# Patient Record
Sex: MALE | Race: WHITE | Employment: OTHER | URBAN - METROPOLITAN AREA
[De-identification: names, ages, dates, MRNs, and addresses within clinical notes are randomized per-mention and may not be internally consistent; named-entity substitution may affect disease eponyms.]

---

## 2017-11-01 ENCOUNTER — HOSPITAL ENCOUNTER (OUTPATIENT)
Dept: OTHER | Age: 62
Discharge: HOME | End: 2017-11-30

## 2017-12-01 ENCOUNTER — HOSPITAL ENCOUNTER (OUTPATIENT)
Dept: OTHER | Age: 62
Discharge: HOME | End: 2017-12-31

## 2018-01-01 ENCOUNTER — HOSPITAL ENCOUNTER (OUTPATIENT)
Dept: OTHER | Age: 63
Discharge: HOME | End: 2018-01-31

## 2018-02-01 ENCOUNTER — HOSPITAL ENCOUNTER (OUTPATIENT)
Dept: OTHER | Age: 63
Discharge: HOME | End: 2018-02-23

## 2021-07-16 ENCOUNTER — APPOINTMENT (OUTPATIENT)
Dept: GENERAL RADIOLOGY | Age: 66
End: 2021-07-16
Payer: MEDICARE

## 2021-07-16 ENCOUNTER — HOSPITAL ENCOUNTER (EMERGENCY)
Age: 66
Discharge: HOME | End: 2021-07-16
Attending: STUDENT IN AN ORGANIZED HEALTH CARE EDUCATION/TRAINING PROGRAM
Payer: MEDICARE

## 2021-07-16 VITALS
WEIGHT: 213 LBS | HEIGHT: 71 IN | DIASTOLIC BLOOD PRESSURE: 62 MMHG | HEART RATE: 90 BPM | TEMPERATURE: 99.4 F | OXYGEN SATURATION: 95 % | BODY MASS INDEX: 29.82 KG/M2 | RESPIRATION RATE: 20 BRPM | SYSTOLIC BLOOD PRESSURE: 113 MMHG

## 2021-07-16 DIAGNOSIS — R79.89 ELEVATED D-DIMER: ICD-10-CM

## 2021-07-16 DIAGNOSIS — A41.9 SEPTICEMIA (HCC): ICD-10-CM

## 2021-07-16 DIAGNOSIS — N17.9 AKI (ACUTE KIDNEY INJURY) (HCC): ICD-10-CM

## 2021-07-16 DIAGNOSIS — J18.9 PNEUMONIA DUE TO INFECTIOUS ORGANISM, UNSPECIFIED LATERALITY, UNSPECIFIED PART OF LUNG: Primary | ICD-10-CM

## 2021-07-16 DIAGNOSIS — R09.02 HYPOXEMIA: ICD-10-CM

## 2021-07-16 LAB
A/G RATIO: 1.6 (ref 1.1–2.2)
ALBUMIN SERPL-MCNC: 4.3 G/DL (ref 3.4–5)
ALP BLD-CCNC: 95 U/L (ref 40–129)
ALT SERPL-CCNC: 15 U/L (ref 10–40)
ANION GAP SERPL CALCULATED.3IONS-SCNC: 11 MMOL/L (ref 3–16)
APTT: 35.9 SEC (ref 26.2–38.6)
AST SERPL-CCNC: 17 U/L (ref 15–37)
BASE EXCESS VENOUS: -4.5 MMOL/L (ref -3–3)
BASOPHILS ABSOLUTE: 0 K/UL (ref 0–0.2)
BASOPHILS RELATIVE PERCENT: 0 %
BILIRUB SERPL-MCNC: 0.5 MG/DL (ref 0–1)
BUN BLDV-MCNC: 22 MG/DL (ref 7–20)
CALCIUM SERPL-MCNC: 9.2 MG/DL (ref 8.3–10.6)
CARBOXYHEMOGLOBIN: 1.3 % (ref 0–1.5)
CHLORIDE BLD-SCNC: 109 MMOL/L (ref 99–110)
CO2: 21 MMOL/L (ref 21–32)
CREAT SERPL-MCNC: 1.8 MG/DL (ref 0.8–1.3)
D DIMER: 448 NG/ML DDU (ref 0–229)
EKG ATRIAL RATE: 106 BPM
EKG DIAGNOSIS: NORMAL
EKG P AXIS: 40 DEGREES
EKG P-R INTERVAL: 150 MS
EKG Q-T INTERVAL: 326 MS
EKG QRS DURATION: 76 MS
EKG QTC CALCULATION (BAZETT): 433 MS
EKG R AXIS: 13 DEGREES
EKG T AXIS: 86 DEGREES
EKG VENTRICULAR RATE: 106 BPM
EOSINOPHILS ABSOLUTE: 0.8 K/UL (ref 0–0.6)
EOSINOPHILS RELATIVE PERCENT: 4 %
GFR AFRICAN AMERICAN: 46
GFR NON-AFRICAN AMERICAN: 38
GLOBULIN: 2.7 G/DL
GLUCOSE BLD-MCNC: 149 MG/DL (ref 70–99)
HCO3 VENOUS: 19.6 MMOL/L (ref 23–29)
HCT VFR BLD CALC: 37.3 % (ref 40.5–52.5)
HEMATOLOGY PATH CONSULT: YES
HEMOGLOBIN: 12.3 G/DL (ref 13.5–17.5)
INR BLD: 1.11 (ref 0.88–1.12)
LACTIC ACID, SEPSIS: 1.9 MMOL/L (ref 0.4–1.9)
LYMPHOCYTES ABSOLUTE: 5 K/UL (ref 1–5.1)
LYMPHOCYTES RELATIVE PERCENT: 24 %
MCH RBC QN AUTO: 31.9 PG (ref 26–34)
MCHC RBC AUTO-ENTMCNC: 33 G/DL (ref 31–36)
MCV RBC AUTO: 96.7 FL (ref 80–100)
METHEMOGLOBIN VENOUS: 0.1 %
MONOCYTES ABSOLUTE: 1.5 K/UL (ref 0–1.3)
MONOCYTES RELATIVE PERCENT: 7 %
NEUTROPHILS ABSOLUTE: 13.7 K/UL (ref 1.7–7.7)
NEUTROPHILS RELATIVE PERCENT: 65 %
O2 CONTENT, VEN: 13 VOL %
O2 SAT, VEN: 71 %
O2 THERAPY: ABNORMAL
PCO2, VEN: 33.2 MMHG (ref 40–50)
PDW BLD-RTO: 13.5 % (ref 12.4–15.4)
PH VENOUS: 7.39 (ref 7.35–7.45)
PLATELET # BLD: 432 K/UL (ref 135–450)
PMV BLD AUTO: 8 FL (ref 5–10.5)
PO2, VEN: 37.1 MMHG (ref 25–40)
POTASSIUM REFLEX MAGNESIUM: 4.7 MMOL/L (ref 3.5–5.1)
PRO-BNP: 363 PG/ML (ref 0–124)
PROCALCITONIN: 0.15 NG/ML (ref 0–0.15)
PROTHROMBIN TIME: 12.7 SEC (ref 9.9–12.7)
RBC # BLD: 3.86 M/UL (ref 4.2–5.9)
SODIUM BLD-SCNC: 141 MMOL/L (ref 136–145)
TCO2 CALC VENOUS: 21 MMOL/L
TOTAL PROTEIN: 7 G/DL (ref 6.4–8.2)
TROPONIN: <0.01 NG/ML
WBC # BLD: 21 K/UL (ref 4–11)

## 2021-07-16 PROCEDURE — 84145 PROCALCITONIN (PCT): CPT

## 2021-07-16 PROCEDURE — 83605 ASSAY OF LACTIC ACID: CPT

## 2021-07-16 PROCEDURE — 99291 CRITICAL CARE FIRST HOUR: CPT

## 2021-07-16 PROCEDURE — 96365 THER/PROPH/DIAG IV INF INIT: CPT

## 2021-07-16 PROCEDURE — 96372 THER/PROPH/DIAG INJ SC/IM: CPT

## 2021-07-16 PROCEDURE — 83880 ASSAY OF NATRIURETIC PEPTIDE: CPT

## 2021-07-16 PROCEDURE — 2580000003 HC RX 258: Performed by: STUDENT IN AN ORGANIZED HEALTH CARE EDUCATION/TRAINING PROGRAM

## 2021-07-16 PROCEDURE — 6370000000 HC RX 637 (ALT 250 FOR IP): Performed by: STUDENT IN AN ORGANIZED HEALTH CARE EDUCATION/TRAINING PROGRAM

## 2021-07-16 PROCEDURE — 82803 BLOOD GASES ANY COMBINATION: CPT

## 2021-07-16 PROCEDURE — 96366 THER/PROPH/DIAG IV INF ADDON: CPT

## 2021-07-16 PROCEDURE — 87040 BLOOD CULTURE FOR BACTERIA: CPT

## 2021-07-16 PROCEDURE — 6360000002 HC RX W HCPCS: Performed by: STUDENT IN AN ORGANIZED HEALTH CARE EDUCATION/TRAINING PROGRAM

## 2021-07-16 PROCEDURE — 85379 FIBRIN DEGRADATION QUANT: CPT

## 2021-07-16 PROCEDURE — 99285 EMERGENCY DEPT VISIT HI MDM: CPT

## 2021-07-16 PROCEDURE — 93005 ELECTROCARDIOGRAM TRACING: CPT | Performed by: STUDENT IN AN ORGANIZED HEALTH CARE EDUCATION/TRAINING PROGRAM

## 2021-07-16 PROCEDURE — 36415 COLL VENOUS BLD VENIPUNCTURE: CPT

## 2021-07-16 PROCEDURE — 85610 PROTHROMBIN TIME: CPT

## 2021-07-16 PROCEDURE — 96375 TX/PRO/DX INJ NEW DRUG ADDON: CPT

## 2021-07-16 PROCEDURE — 71045 X-RAY EXAM CHEST 1 VIEW: CPT

## 2021-07-16 PROCEDURE — 80053 COMPREHEN METABOLIC PANEL: CPT

## 2021-07-16 PROCEDURE — 85025 COMPLETE CBC W/AUTO DIFF WBC: CPT

## 2021-07-16 PROCEDURE — 85730 THROMBOPLASTIN TIME PARTIAL: CPT

## 2021-07-16 PROCEDURE — 84484 ASSAY OF TROPONIN QUANT: CPT

## 2021-07-16 PROCEDURE — 93010 ELECTROCARDIOGRAM REPORT: CPT | Performed by: INTERNAL MEDICINE

## 2021-07-16 RX ORDER — 0.9 % SODIUM CHLORIDE 0.9 %
1000 INTRAVENOUS SOLUTION INTRAVENOUS ONCE
Status: COMPLETED | OUTPATIENT
Start: 2021-07-16 | End: 2021-07-16

## 2021-07-16 RX ORDER — 0.9 % SODIUM CHLORIDE 0.9 %
1000 INTRAVENOUS SOLUTION INTRAVENOUS ONCE
Status: DISCONTINUED | OUTPATIENT
Start: 2021-07-16 | End: 2021-07-16 | Stop reason: HOSPADM

## 2021-07-16 RX ORDER — ACETAMINOPHEN 325 MG/1
650 TABLET ORAL ONCE
Status: COMPLETED | OUTPATIENT
Start: 2021-07-16 | End: 2021-07-16

## 2021-07-16 RX ADMIN — DEXTROSE MONOHYDRATE 500 MG: 50 INJECTION, SOLUTION INTRAVENOUS at 09:43

## 2021-07-16 RX ADMIN — ACETAMINOPHEN 650 MG: 325 TABLET ORAL at 09:07

## 2021-07-16 RX ADMIN — ENOXAPARIN SODIUM 100 MG: 100 INJECTION SUBCUTANEOUS at 09:41

## 2021-07-16 RX ADMIN — CEFTRIAXONE SODIUM 1000 MG: 1 INJECTION, POWDER, FOR SOLUTION INTRAMUSCULAR; INTRAVENOUS at 09:40

## 2021-07-16 RX ADMIN — SODIUM CHLORIDE 1000 ML: 9 INJECTION, SOLUTION INTRAVENOUS at 09:08

## 2021-07-16 ASSESSMENT — ENCOUNTER SYMPTOMS
PHOTOPHOBIA: 0
DIARRHEA: 0
RHINORRHEA: 0
NAUSEA: 0
ABDOMINAL PAIN: 0
COUGH: 1
BACK PAIN: 0
CONSTIPATION: 0
VOMITING: 0
SHORTNESS OF BREATH: 1
SORE THROAT: 0

## 2021-07-16 ASSESSMENT — PAIN DESCRIPTION - LOCATION: LOCATION: HIP

## 2021-07-16 ASSESSMENT — PAIN DESCRIPTION - ORIENTATION: ORIENTATION: RIGHT

## 2021-07-16 ASSESSMENT — PAIN SCALES - GENERAL: PAINLEVEL_OUTOF10: 0

## 2021-07-16 NOTE — ED NOTES
Rossi Chua at the transfer center is paging the hospitalist at [place].      Asaf Ron  07/16/21 9246

## 2021-07-16 NOTE — ED NOTES
Report to [facility]. Pt alert and oriented and VSS upon departure.       Racheal Abdi RN  07/16/21 8046

## 2021-07-16 NOTE — ED PROVIDER NOTES
[facility]      Pt Name: Wilber Sanchez  MRN: [id number]  [place] 1955  Date of evaluation: 7/16/2021  Provider: Radha Thompson, [address] [place] [address]       Chief Complaint   Patient presents with    Fever     pt states that he woke up this AM weak, shaky, and running a fever. 86% on room air upon arrival.          HISTORY OF PRESENT ILLNESS   (Location/Symptom, Timing/Onset, Context/Setting, Quality, Duration, Modifying Factors, Severity)  Note limiting factors. Wilber Sanchez is a 76 y.o. male who presents to the emergency department complaining of fevers chills feeling shaky. States that he saw his P[facility] yesterday, [facility], states that yesterday began to feel chills however significant symptoms developed overnight. Patient arrives febrile at 100.4 orally, was noted to be hypoxic to 87% on room air. Is a smoker. Is not on oxygen at baseline. Sats improved on couple liters nasal cannula with improvement of shortness of breath complaint. Initially patient had denied coughing however significant other later stated that he had been coughing beginning yesterday evening. States that he is been like this previously with episodes of pneumonia. He is tachycardic on arrival, clinically appears dehydrated. Denies nausea vomiting diarrhea. Denies decreased p.o. intake. Denies history of DVT/PE. Denies lower extremity symmetry posterior calf pain, recent travel immobilization. Patient has received both coronavirus vaccines earlier this year. Patient denies headache new neck or back pain, chest pain, abdominal pain. His biggest complaint at presentation with generalized weakness and fatigue. Nursing Notes were reviewed.     PAST MEDICAL HISTORY     Past Medical History:   Diagnosis Date    Large cell lymphoma ([facility] [address]     Staphylococcus infection in conditions classified elsewhere and of unspecified site     spinal         SURGICAL HISTORY Past Surgical History:   Procedure Laterality Date    BACK SURGERY      KIDNEY REMOVAL      left    TONSILLECTOMY           CURRENT MEDICATIONS       Previous Medications    OXYCODONE-ACETAMINOPHEN (PERCOCET) 5-325 MG PER TABLET    Take 1 tablet by mouth every 4 hours as needed for Pain. ALLERGIES     Ibuprofen and Ketorolac tromethamine    FAMILY HISTORY     History reviewed. No pertinent family history. SOCIAL HISTORY       Social History     Socioeconomic History    Marital status:      Spouse name: None    Number of children: None    Years of education: None    Highest education level: None   Occupational History    None   Tobacco Use    Smoking status: Current Every Day Smoker     Packs/day: 0.50     Types: E-Cigarettes    Smokeless tobacco: Never Used   Vaping Use    Vaping Use: Never assessed   Substance and Sexual Activity    Alcohol use: Never    Drug use: Never    Sexual activity: None   Other Topics Concern    None   Social History Narrative    None     Social Determinants of Health     Financial Resource Strain:     Difficulty of Paying Living Expenses:    Food Insecurity:     Worried About Running Out of Food in the Last Year:     Ran Out of Food in the Last Year:    Transportation Needs:     Lack of Transportation (Medical):      Lack of Transportation (Non-Medical):    Physical Activity:     Days of Exercise per Week:     Minutes of Exercise per Session:    Stress:     Feeling of Stress :    Social Connections:     Frequency of Communication with Friends and Family:     Frequency of Social Gatherings with Friends and Family:     Attends [employer] Services:     Active Member of Clubs or Organizations:     Attends Club or Organization Meetings:     Marital Status:    Intimate Partner Violence:     Fear of Current or Ex-Partner:     Emotionally Abused:     Physically Abused:     Sexually Abused:        SCREENINGS                            REVIEW OF SYSTEMS    (2-9 systems for level 4, 10 or more for level 5)   Review of Systems   Constitutional: Positive for chills, fatigue and fever. HENT: Negative for congestion, rhinorrhea and sore throat. Eyes: Negative for photophobia and visual disturbance. Respiratory: Positive for cough and shortness of breath. Cardiovascular: Negative for chest pain and palpitations. Gastrointestinal: Negative for abdominal pain, constipation, diarrhea, nausea and vomiting. Genitourinary: Negative for decreased urine volume. Musculoskeletal: Positive for myalgias. Negative for back pain and neck pain. Skin: Negative for rash. Neurological: Negative for weakness, numbness and headaches. Psychiatric/Behavioral: Negative for confusion. PHYSICAL EXAM    (up to 7 for level 4, 8 or more for level 5)     ED Triage Vitals   BP Temp Temp src Pulse Resp SpO2 Height Weight   -- -- -- -- -- -- -- --       Physical Exam  Constitutional:       General: He is not in acute distress. Appearance: He is ill-appearing. He is not diaphoretic. HENT:      Head: Normocephalic and atraumatic. Mouth/Throat:      Mouth: Mucous membranes are dry. Eyes:      Pupils: Pupils are equal, round, and reactive to light. Neck:      Trachea: No tracheal deviation. Cardiovascular:      Rate and Rhythm: Regular rhythm. Tachycardia present. Pulmonary:      Effort: Pulmonary effort is normal. No respiratory distress. Breath sounds: No stridor. No wheezing. Abdominal:      General: There is no distension. Palpations: Abdomen is soft. Tenderness: There is no abdominal tenderness. Musculoskeletal:         General: No deformity. Normal range of motion. Cervical back: Normal range of motion and neck supple. Skin:     General: Skin is warm and dry. Neurological:      Mental Status: He is alert and oriented to person, place, and time.          DIAGNOSTIC RESULTS     EKG: All EKG's are interpreted by the Emergency Department Physician who either signs or Co-signs this chart in the absence of a cardiologist.      The Ekg interpreted by me shows  sinus tachycardia, [id number]=[id number] with a rate of   Axis is   Normal  QTc is  normal  Intervals and Durations are unremarkable. ST Segments: nonspecific changes        RADIOLOGY:   Non-plain film images such as CT, Ultrasound and MRI are read by the radiologist. Plain radiographic images are visualized and preliminarily interpreted by the emergency physician. Interpretation per the Radiologist below, if available at the time of this note:    XR CHEST PORTABLE   Final Result   Moderate right basilar airspace disease, lower lobe and possibly right middle   lobe as well. This likely represents pneumonia. LABS:  Labs Reviewed   CBC WITH AUTO DIFFERENTIAL - Abnormal; Notable for the following components:       Result Value    WBC 21.0 (*)     RBC 3.86 (*)     Hemoglobin 12.3 (*)     Hematocrit 37.3 (*)     All other components within normal limits    Narrative:     Performed at:  [facility]  [address] [place] [address]. [place] [facility] [address]    Phone [phone]   COMPREHENSIVE METABOLIC PANEL W/ REFLEX TO MG FOR LOW K - Abnormal; Notable for the following components:    Glucose 149 (*)     BUN 22 (*)     CREATININE 1.8 (*)     GFR Non- 38 (*)     GFR  46 (*)     All other components within normal limits    Narrative:     Performed at:  [address] [facility]  [address] [place] [address]. [place], [id number]   Phone [phone] - Abnormal; Notable for the following components:    Pro- (*)     All other components within normal limits    Narrative:     Performed at:  [address] [facility]  [address] [place] [address].  [place] [facility] [address] [employer]   Phone [phone]   BLOOD GAS, VENOUS - Abnormal; Notable for the following components:    pCO2, [place] 33.2 (*)     HCO3, Venous 19.6 (*)     Base Excess, [place] -4.5 (*)     All other components within normal limits    Narrative:     Performed at:  [facility]. [facility]  [address] [place] [address]. [place], [facility] [address] [employer]   Phone [phone]   D-DIMER, QUANTITATIVE - Abnormal; Notable for the following components:    D-Dimer, Quant 448 (*)     All other components within normal limits    Narrative:     Performed at:  [facility]. [facility]  [address] [place] [address]. [place], [facility] [address] [employer]   Phone [phone]   CULTURE, BLOOD 1   CULTURE, BLOOD 2   CULTURE, URINE   TROPONIN    Narrative:     Performed at:  [facility]. [facility]  [address] [place] [address]. [place] [facility] [address] [employer]   Phone [phone]   LACTATE, SEPSIS    Narrative:     Performed at:  [facility]. [facility]  [address] [place] [address]. [place] [facility] [employer]   Phone [phone]    Narrative:     Performed at:  [facility]. [facility]  [address] [place] [address]. [place] [facility] [place]   Phone [phone]   APTT    Narrative:     Performed at:  [facility]. [facility]  [address] [place] [address]. [place] [facility] [place]   Phone [phone]   PROCALCITONIN   LACTATE, SEPSIS   URINALYSIS       All other labs were within normal range or not returned as of this dictation. EMERGENCY DEPARTMENT COURSE and DIFFERENTIAL DIAGNOSIS/MDM:   Nancy Man is a 76 y.o. male who presents to the emergency department with the complaint of complaining of generalized weakness fatigue, fevers, chills. On arrival slightly hypotensive with blood pressure 109/52, clinically appears dehydrated will treat with IV fluids. He is febrile 100.4 tachycardic up to 118, was found to be hypoxic 87% on room air placed on nasal cannula oxygen with improvement.   Not on oxygen at baseline. Patient is meeting SIRS criteria based on above. Sepsis labs were initiated at that time. We will hold off on antibiotics for specificity until further work-up was obtained. Clinically patient appears ill but nontoxic. Clinically no signs of DVT. However due to tachycardia and hypoxia will further investigate this with D-dimer. Will treat fever with dose of Tylenol. Patient appears to be stable nasal cannula oxygen from a respiratory standpoint. Anticipate admission due to new oxygen requirement, SIRS sepsis concern. Patient follows with the [place] likely will be transferred to [facility] for management. At this time unclear source of infection, higher concern though for respiratory component based on his shortness of breath complaint at presentation. Nonspecific EKG, initial troponin less than 0.01. White count is elevated however history of lymphoma patient reports most recently white count was 18,000 today at 21,000. D-dimer was mildly elevated in the 400s cannot exclude PE however more likely elevated due to infection, pneumonia. Unfortunately patient has solitary kidney with MARY today will cover with dose of Lovenox. Patient was started on antibiotic Rocephin, Zithromax for pneumonia, heart rate downtrending most recently 95 with IV fluids ongoing. SPO2 stable on 2 L at 95%. Will discuss with [facility] for transfer and admission for suspected bacterial pneumonia    He was accepted for transfer to [address] [place] [address] under Dr. Kiko Singleton. Awaiting transport, room assignment        CRITICAL CARE TIME   Total Critical Care time was 45 minutes, excluding separately reportable procedures. There was a high probability of clinically significant/life threatening deterioration in the patient's condition which required my urgent intervention.      Clinical concern sepsis pneumonia  Intervention history, physical exam, reevaluation following fluid bolus, lab interpretation, charting, discussion with admission services    CONSULTS:  None    PROCEDURES:  Unless otherwise noted below, none     Procedures        FINAL IMPRESSION      1. Pneumonia due to infectious organism, unspecified laterality, unspecified part of lung    2. Septicemia ([facility] [address]    3. Hypoxemia    4. MARY (acute kidney injury) ([facility] [address]    5. Elevated d-dimer          DISPOSITION/PLAN   [facility]POSITION  [facility]      PATIENT REFERRED TO:  No follow-up provider specified. DISCHARGE MEDICATIONS:  New Prescriptions    No medications on file     Controlled Substances Monitoring:     No flowsheet data found.     (Please note that portions of this note were completed with a voice recognition program.  Efforts were made to edit the dictations but occasionally words are mis-transcribed.)    Luke Eden DO (electronically signed)  Attending Emergency Physician            Luke Eden DO  07/16/21 1 Kurt Wren DO  07/16/21 1100

## 2021-07-19 LAB — HEMATOLOGY PATH CONSULT: NORMAL

## 2021-07-20 LAB
BLOOD CULTURE, ROUTINE: NORMAL
CULTURE, BLOOD 2: NORMAL